# Patient Record
Sex: FEMALE | Race: BLACK OR AFRICAN AMERICAN | Employment: UNEMPLOYED | URBAN - METROPOLITAN AREA
[De-identification: names, ages, dates, MRNs, and addresses within clinical notes are randomized per-mention and may not be internally consistent; named-entity substitution may affect disease eponyms.]

---

## 2021-06-25 ENCOUNTER — APPOINTMENT (OUTPATIENT)
Dept: CT IMAGING | Age: 86
End: 2021-06-25
Attending: EMERGENCY MEDICINE
Payer: MEDICARE

## 2021-06-25 ENCOUNTER — APPOINTMENT (OUTPATIENT)
Dept: GENERAL RADIOLOGY | Age: 86
End: 2021-06-25
Attending: EMERGENCY MEDICINE
Payer: MEDICARE

## 2021-06-25 ENCOUNTER — HOSPITAL ENCOUNTER (OUTPATIENT)
Age: 86
Setting detail: OBSERVATION
Discharge: HOME | End: 2021-06-27
Attending: EMERGENCY MEDICINE | Admitting: INTERNAL MEDICINE
Payer: MEDICARE

## 2021-06-25 DIAGNOSIS — R00.1 SINUS BRADYCARDIA: ICD-10-CM

## 2021-06-25 DIAGNOSIS — R55 SYNCOPE AND COLLAPSE: Primary | ICD-10-CM

## 2021-06-25 LAB
ALBUMIN SERPL-MCNC: 3.1 G/DL (ref 3.5–5)
ALBUMIN/GLOB SERPL: 0.9 {RATIO} (ref 1.1–2.2)
ALP SERPL-CCNC: 60 U/L (ref 45–117)
ALT SERPL-CCNC: 14 U/L (ref 12–78)
ANION GAP SERPL CALC-SCNC: 3 MMOL/L (ref 5–15)
AST SERPL-CCNC: 11 U/L (ref 15–37)
ATRIAL RATE: 55 BPM
BASOPHILS # BLD: 0.1 K/UL (ref 0–0.1)
BASOPHILS NFR BLD: 1 % (ref 0–1)
BILIRUB SERPL-MCNC: 0.3 MG/DL (ref 0.2–1)
BNP SERPL-MCNC: 54 PG/ML
BUN SERPL-MCNC: 9 MG/DL (ref 6–20)
BUN/CREAT SERPL: 16 (ref 12–20)
CALCIUM SERPL-MCNC: 9.1 MG/DL (ref 8.5–10.1)
CALCULATED P AXIS, ECG09: 63 DEGREES
CALCULATED R AXIS, ECG10: 35 DEGREES
CALCULATED T AXIS, ECG11: 72 DEGREES
CHLORIDE SERPL-SCNC: 103 MMOL/L (ref 97–108)
CO2 SERPL-SCNC: 30 MMOL/L (ref 21–32)
CREAT SERPL-MCNC: 0.55 MG/DL (ref 0.55–1.02)
DIAGNOSIS, 93000: NORMAL
DIFFERENTIAL METHOD BLD: ABNORMAL
EOSINOPHIL # BLD: 0.3 K/UL (ref 0–0.4)
EOSINOPHIL NFR BLD: 3 % (ref 0–7)
ERYTHROCYTE [DISTWIDTH] IN BLOOD BY AUTOMATED COUNT: 13.5 % (ref 11.5–14.5)
GLOBULIN SER CALC-MCNC: 3.4 G/DL (ref 2–4)
GLUCOSE SERPL-MCNC: 112 MG/DL (ref 65–100)
HCT VFR BLD AUTO: 33.3 % (ref 35–47)
HGB BLD-MCNC: 11.2 G/DL (ref 11.5–16)
IMM GRANULOCYTES # BLD AUTO: 0 K/UL (ref 0–0.04)
IMM GRANULOCYTES NFR BLD AUTO: 1 % (ref 0–0.5)
LYMPHOCYTES # BLD: 3.4 K/UL (ref 0.8–3.5)
LYMPHOCYTES NFR BLD: 43 % (ref 12–49)
MAGNESIUM SERPL-MCNC: 2 MG/DL (ref 1.6–2.4)
MCH RBC QN AUTO: 31.5 PG (ref 26–34)
MCHC RBC AUTO-ENTMCNC: 33.6 G/DL (ref 30–36.5)
MCV RBC AUTO: 93.8 FL (ref 80–99)
MONOCYTES # BLD: 0.3 K/UL (ref 0–1)
MONOCYTES NFR BLD: 4 % (ref 5–13)
NEUTS SEG # BLD: 3.9 K/UL (ref 1.8–8)
NEUTS SEG NFR BLD: 48 % (ref 32–75)
NRBC # BLD: 0 K/UL (ref 0–0.01)
NRBC BLD-RTO: 0 PER 100 WBC
P-R INTERVAL, ECG05: 220 MS
PLATELET # BLD AUTO: 302 K/UL (ref 150–400)
PMV BLD AUTO: 8.8 FL (ref 8.9–12.9)
POTASSIUM SERPL-SCNC: 3.9 MMOL/L (ref 3.5–5.1)
PROT SERPL-MCNC: 6.5 G/DL (ref 6.4–8.2)
Q-T INTERVAL, ECG07: 414 MS
QRS DURATION, ECG06: 90 MS
QTC CALCULATION (BEZET), ECG08: 396 MS
RBC # BLD AUTO: 3.55 M/UL (ref 3.8–5.2)
SODIUM SERPL-SCNC: 136 MMOL/L (ref 136–145)
TROPONIN I SERPL-MCNC: <0.05 NG/ML
VENTRICULAR RATE, ECG03: 55 BPM
WBC # BLD AUTO: 7.9 K/UL (ref 3.6–11)

## 2021-06-25 PROCEDURE — 36415 COLL VENOUS BLD VENIPUNCTURE: CPT

## 2021-06-25 PROCEDURE — 71275 CT ANGIOGRAPHY CHEST: CPT

## 2021-06-25 PROCEDURE — 71045 X-RAY EXAM CHEST 1 VIEW: CPT

## 2021-06-25 PROCEDURE — 83735 ASSAY OF MAGNESIUM: CPT

## 2021-06-25 PROCEDURE — 85025 COMPLETE CBC W/AUTO DIFF WBC: CPT

## 2021-06-25 PROCEDURE — 99218 HC RM OBSERVATION: CPT

## 2021-06-25 PROCEDURE — 70450 CT HEAD/BRAIN W/O DYE: CPT

## 2021-06-25 PROCEDURE — 99285 EMERGENCY DEPT VISIT HI MDM: CPT

## 2021-06-25 PROCEDURE — 93005 ELECTROCARDIOGRAM TRACING: CPT

## 2021-06-25 PROCEDURE — 80053 COMPREHEN METABOLIC PANEL: CPT

## 2021-06-25 PROCEDURE — 74011250637 HC RX REV CODE- 250/637: Performed by: INTERNAL MEDICINE

## 2021-06-25 PROCEDURE — 84484 ASSAY OF TROPONIN QUANT: CPT

## 2021-06-25 PROCEDURE — 74011000636 HC RX REV CODE- 636: Performed by: EMERGENCY MEDICINE

## 2021-06-25 PROCEDURE — 83880 ASSAY OF NATRIURETIC PEPTIDE: CPT

## 2021-06-25 RX ORDER — ALBUTEROL SULFATE 0.83 MG/ML
2.5 SOLUTION RESPIRATORY (INHALATION)
COMMUNITY

## 2021-06-25 RX ORDER — ONDANSETRON 4 MG/1
4 TABLET, ORALLY DISINTEGRATING ORAL
Status: DISCONTINUED | OUTPATIENT
Start: 2021-06-25 | End: 2021-06-27 | Stop reason: HOSPADM

## 2021-06-25 RX ORDER — SODIUM CHLORIDE 0.9 % (FLUSH) 0.9 %
5-40 SYRINGE (ML) INJECTION AS NEEDED
Status: DISCONTINUED | OUTPATIENT
Start: 2021-06-25 | End: 2021-06-27 | Stop reason: HOSPADM

## 2021-06-25 RX ORDER — ENOXAPARIN SODIUM 100 MG/ML
40 INJECTION SUBCUTANEOUS DAILY
Status: DISCONTINUED | OUTPATIENT
Start: 2021-06-26 | End: 2021-06-25

## 2021-06-25 RX ORDER — SODIUM CHLORIDE 0.9 % (FLUSH) 0.9 %
5-40 SYRINGE (ML) INJECTION EVERY 8 HOURS
Status: DISCONTINUED | OUTPATIENT
Start: 2021-06-25 | End: 2021-06-27 | Stop reason: HOSPADM

## 2021-06-25 RX ORDER — ACETAMINOPHEN 650 MG/1
650 SUPPOSITORY RECTAL
Status: DISCONTINUED | OUTPATIENT
Start: 2021-06-25 | End: 2021-06-27 | Stop reason: HOSPADM

## 2021-06-25 RX ORDER — DOCUSATE SODIUM 100 MG/1
100 CAPSULE, LIQUID FILLED ORAL DAILY
COMMUNITY

## 2021-06-25 RX ORDER — POLYETHYLENE GLYCOL 3350 17 G/17G
17 POWDER, FOR SOLUTION ORAL DAILY PRN
Status: DISCONTINUED | OUTPATIENT
Start: 2021-06-25 | End: 2021-06-27 | Stop reason: HOSPADM

## 2021-06-25 RX ORDER — ACETAMINOPHEN 500 MG
500 TABLET ORAL
COMMUNITY

## 2021-06-25 RX ORDER — SODIUM CHLORIDE TAB 1 GM 1 G
1 TAB MISCELLANEOUS 2 TIMES DAILY
COMMUNITY

## 2021-06-25 RX ORDER — ACETAMINOPHEN 325 MG/1
650 TABLET ORAL
Status: DISCONTINUED | OUTPATIENT
Start: 2021-06-25 | End: 2021-06-27 | Stop reason: HOSPADM

## 2021-06-25 RX ORDER — FLUTICASONE PROPIONATE AND SALMETEROL 250; 50 UG/1; UG/1
1 POWDER RESPIRATORY (INHALATION) 2 TIMES DAILY
COMMUNITY

## 2021-06-25 RX ORDER — ONDANSETRON 2 MG/ML
4 INJECTION INTRAMUSCULAR; INTRAVENOUS
Status: DISCONTINUED | OUTPATIENT
Start: 2021-06-25 | End: 2021-06-27 | Stop reason: HOSPADM

## 2021-06-25 RX ORDER — AMOXICILLIN 250 MG
1 CAPSULE ORAL 2 TIMES DAILY
Status: DISCONTINUED | OUTPATIENT
Start: 2021-06-25 | End: 2021-06-27 | Stop reason: HOSPADM

## 2021-06-25 RX ORDER — CHOLECALCIFEROL TAB 125 MCG (5000 UNIT) 125 MCG
5000 TAB ORAL
COMMUNITY

## 2021-06-25 RX ORDER — ONDANSETRON 2 MG/ML
4 INJECTION INTRAMUSCULAR; INTRAVENOUS
Status: DISCONTINUED | OUTPATIENT
Start: 2021-06-25 | End: 2021-06-25 | Stop reason: SDUPTHER

## 2021-06-25 RX ORDER — ACETAMINOPHEN 325 MG/1
650 TABLET ORAL
Status: DISCONTINUED | OUTPATIENT
Start: 2021-06-25 | End: 2021-06-25 | Stop reason: SDUPTHER

## 2021-06-25 RX ORDER — ATORVASTATIN CALCIUM 10 MG/1
10 TABLET, FILM COATED ORAL
Status: DISCONTINUED | OUTPATIENT
Start: 2021-06-25 | End: 2021-06-27 | Stop reason: HOSPADM

## 2021-06-25 RX ADMIN — Medication 10 ML: at 21:53

## 2021-06-25 RX ADMIN — IOPAMIDOL 100 ML: 755 INJECTION, SOLUTION INTRAVENOUS at 17:02

## 2021-06-25 RX ADMIN — ATORVASTATIN CALCIUM 10 MG: 10 TABLET, FILM COATED ORAL at 21:53

## 2021-06-25 RX ADMIN — APIXABAN 5 MG: 5 TABLET, FILM COATED ORAL at 20:40

## 2021-06-25 NOTE — ED NOTES
1158: Assumed care of patient from EMS. Patient triaged. Placed on monitor x 3. SR x 2. Call bell in reach. 1645: Pt to CT at this time. 1735: Hospitalist at bedside. 1817: Patient is being transferred to [facility] Clinical OBS, Room # 75 [phone]. Report given to Raghu Medina on Kady Velasquez for routine progression of care. Report consisted of the following information SBAR, Kardex, ED Summary, Intake/Output, MAR and Recent Results. Patient transferred to receiving unit by: JOSELUIS Baird (RN or tech name). Outstanding consults needed: No     Next labs due: No     The following personal items will be sent with the patient during transfer to the floor:     All valuables:    Cardiac monitoring ordered: Yes    The following CURRENT information was reported to the receiving RN:    Code status: Full Code at time of transfer    Last set of vital signs:  Vital Signs  Level of Consciousness: Alert (0) (06/25/21 1645)  Temp: 98.4 °F (36.9 °C) (06/25/21 1645)  Temp Source: Oral (06/25/21 1645)  Pulse (Heart Rate): 68 (06/25/21 1645)  Cardiac Rhythm: Sinus Matthew (06/25/21 1351)  Resp Rate: 15 (06/25/21 1645)  BP: (!) 141/79 (06/25/21 1645)  MAP (Monitor): 93 (06/25/21 1645)  MAP (Calculated): 100 (06/25/21 1645)  BP 1 Location: Right arm (06/25/21 1538)  BP 1 Method: Automatic (06/25/21 1538)  BP Patient Position: Lying (06/25/21 1538)  MEWS Score: 1 (06/25/21 1645)    Additional Blood Pressure/Pulse Data  Pulse 2: 69 (06/25/21 1538)  BP 2: 144/74 (06/25/21 1538)  BP 2 Location: Right arm (06/25/21 1538)  BP Method 2: Automatic (06/25/21 1538)  Patient Position 2: Sitting (06/25/21 1538)    Oxygen Therapy  O2 Sat (%): 97 % (06/25/21 1645)  Pulse via Oximetry: 68 beats per minute (06/25/21 1645)  O2 Device: None (Room air) (06/25/21 1645)      Last pain assessment:  Pain 1  Pain Scale 1: Numeric (0 - 10)  Pain Intensity 1: 0  Patient Stated Pain Goal: 0      Wounds: No     LDAs:       Peripheral IV 06/25/21 Left Forearm (Active) Site Assessment Clean, dry, & intact 06/25/21 1158   Phlebitis Assessment 0 06/25/21 1158   Infiltration Assessment 0 06/25/21 1158   Dressing Status Clean, dry, & intact 06/25/21 1158   Dressing Type Transparent 06/25/21 1158   Hub Color/Line Status Flushed 06/25/21 1158       Peripheral IV 06/25/21 Left Antecubital (Active)   Site Assessment Clean, dry, & intact 06/25/21 1535   Phlebitis Assessment 0 06/25/21 1535   Infiltration Assessment 0 06/25/21 1535   Dressing Status Clean, dry, & intact 06/25/21 1535   Dressing Type 4 X 4 06/25/21 1535         Opportunity for questions and clarification was provided.     Dayna Hand

## 2021-06-25 NOTE — H&P
Hospitalist Admission Note    NAME: Norman Narvaez   :  1934   MRN:  [id number]     Date/Time:  2021 3:59 PM    Patient PCP: Unknown, Provider, MD  _____________________________________________________________________  Given the patient's current clinical presentation, I have a high level of concern for decompensation if discharged from the emergency department. Complex decision making was performed, which includes reviewing the patient's available past medical records, laboratory results, and x-ray films. My assessment of this patient's clinical condition and my plan of care is as follows. Assessment / Plan:    Syncope  History of atrial fibrillation? Sinus bradycardia (55)  -CTA of chest no PE.    -Echocardiogram  -Cardiac monitoring to screen for sick sinus syndrome  -She is not prerenal by labs. -She is not ambulatory. Follow supine and sitting blood pressure  -continue eliquis  -observation admit. Likely home over the weekend if telemetry, orthostatic BP and Echo are okay    Hyperlipidemia  -Continue statin    Glaucoma  -She is now blind    S/P ORIF for left wrist and left distal femur fracture     Code Status: Full  Surrogate Decision Maker: daughter    DVT Prophylaxis: eliquis  GI Prophylaxis: not indicated    Baseline: . Lives with daughter. Bedbound. Blind        Subjective:   CHIEF COMPLAINT:  Passed out    HISTORY OF PRESENT ILLNESS:     Henry Portillo is a 80 y.o. female with a history of atrial fibrillation hypertension hyperlipidemia and blindness who presents to the ER via EMS after a syncopal episode. Patient reportedly passed out once back in 2017. The episode today occurred in her bedroom. Patient is blind and hard of hearing. Family states that the patient was eating and sitting at the edge of her bed. Family then heard a noise from the room and found the patient passed out laying back on her bed.  They do not know if there was loss of pulses but family did start some chest compressions and the patient quickly came to. Family states they took her blood pressure when she was gasping for breath and feeling bad and they noted it was low at around 359 systolic which is not normal for her. EMS did note that her heart rate would drop sporadically. Family does endorse a history of having a low heart rate and states that she is taking Eliquis because of it. In the ED, patient currently appears to be back at her baseline. Reportedly she has been eating and drinking well. CTA chest no PE. We were asked to admit for work up and evaluation of the above problems. Past Medical History:   Diagnosis Date    Cataract     Glaucoma     Hypercholesteremia     Hypertension         History reviewed. No pertinent surgical history. Social History     Tobacco Use    Smoking status: Never Smoker    Smokeless tobacco: Never Used   Substance Use Topics    Alcohol use: Yes        Family History   Problem Relation Age of Onset    Hypertension Mother     Hypertension Father      No Known Allergies     Prior to Admission medications    Medication Sig Start Date End Date Taking? Authorizing Provider   bisacodyl (DULCOLAX) 10 mg suppository Insert 10 mg into rectum daily. 9/11/12   Bernard Shelton MD   ascorbic acid (VITAMIN C) 500 mg tablet Take 1 Tab by mouth two (2) times daily (with meals). 9/11/12   Bernard Shelton MD   brimonidine (ALPHAGAN) 0.2 % ophthalmic solution Administer 1 Drop to both eyes three (3) times daily. 9/11/12   Bernard Shelton MD   calcium-vitamin D (OYSTER SHELL) 500 mg(1,250mg) -200 unit per tablet Take 1 Tab by mouth three (3) times daily (with meals). 9/11/12   Bernard Shelton MD   cholecalciferol (VITAMIN D3) 1,000 unit tablet Take 2 Tabs by mouth daily. 9/11/12   Bernard Shelton MD   dorzolamide-timolol (COSOPT) 2-0.5 % ophthalmic solution Administer 1 Drop to both eyes two (2) times a day.  9/11/12   Bernard Shelton MD   ferrous sulfate 325 mg (65 mg iron) tablet Take 1 Tab by mouth three (3) times daily (with meals). 9/11/12   Daniel Eldridge MD   HYDROcodone-acetaminophen [facility]  mg tablet Take 1 Tab by mouth every four (4) hours as needed. 9/11/12   Daniel Eldridge MD   latanoprost (XALATAN) 0.005 % ophthalmic solution Administer 1 Drop to both eyes every evening. 9/11/12   Daniel Eldridge MD   senna-docusate (PERICOLACE) 8.6-50 mg per tablet Take 1 Tab by mouth two (2) times a day. 9/11/12   Daniel Eldridge MD   enoxaparin (LOVENOX) 40 mg/0.4 mL 40 mg by SubCUTAneous route daily. 9/11/12   Daniel Eldridge MD   hydrochlorothiazide (HYDRODIURIL) 25 mg tablet Take 25 mg by mouth daily. Fany Yusuf MD   simvastatin (ZOCOR) 20 mg tablet Take 20 mg by mouth nightly.       Fany Yusuf MD       REVIEW OF SYSTEMS:     Total of 12 systems reviewed as follows:       POSITIVE= underlined text  Negative = text not underlined  General:  fever, chills, sweats, generalized weakness, weight loss/gain,      loss of appetite   Eyes:    blurred vision, eye pain, loss of vision, double vision  ENT:    rhinorrhea, pharyngitis   Respiratory:   cough, sputum production, SOB, [place], wheezing, pleuritic pain   Cardiology:   chest pain, palpitations, orthopnea, PND, edema, syncope   Gastrointestinal:  abdominal pain , N/V, diarrhea, dysphagia, constipation, bleeding   Genitourinary:  frequency, urgency, dysuria, hematuria, incontinence   Muskuloskeletal :  arthralgia, myalgia, back pain  Hematology:  easy bruising, nose or gum bleeding, lymphadenopathy   Dermatological: rash, ulceration, pruritis, color change / jaundice  Endocrine:   hot flashes or polydipsia   Neurological:  headache, dizziness, confusion, focal weakness, paresthesia,     Speech difficulties, memory loss, non ambulatory, syncope  Psychological: Feelings of anxiety, depression, agitation    Objective:   VITALS:    Visit Vitals  /72 (BP 1 Location: Right arm, BP Patient Position: Lying)   Pulse 66   Temp 97.7 °F (36.5 °C)   Resp 18   Ht 5' 5\" (1.651 m)   Wt 93.9 kg (207 lb)   SpO2 97%   BMI 34.45 kg/m²       PHYSICAL EXAM:    General:    Alert, cooperative, no distress, appears stated age. HEENT: Atraumatic, anicteric sclerae, pink conjunctivae,  BLIND  Neck:  Supple, symmetrical,  thyroid: non tender  Lungs:   Clear to auscultation bilaterally. No Wheezing or Rhonchi. No rales. Chest wall:  No tenderness  No Accessory muscle use. Heart:   Regular  rhythm,  No  murmur   No edema  Abdomen:   Soft, non-tender. Not distended. Bowel sounds normal  Extremities: No cyanosis. No clubbing,      Contracted LE  Skin:     Not pale. Not Jaundiced  No rashes   Psych:  Fair insight. Not depressed. Not anxious or agitated. Neurologic: EOMs intact. No facial asymmetry. No aphasia or slurred speech. Symmetrical strength, Sensation grossly intact. Alert and follows commands    _______________________________________________________________________  Care Plan discussed with:    Comments   Patient y    Family  y    RN     Care Manager                    Consultant:      _______________________________________________________________________  Expected  Disposition:   Home with Family    HH/PT/OT/RN    SNF/LTC    LAUREN    ________________________________________________________________________  TOTAL TIME:  39  Minutes    Critical Care Provided     Minutes non procedure based      Comments     Reviewed previous records   >50% of visit spent in counseling and coordination of care  Discussion with patient and/or family and questions answered       Given the patient's current clinical presentation, I have a high level of concern for decompensation if discharged from the ED. Complex decision making was performed which includes reviewing the patient's available past medical records, laboratory results, and Xray films.  I have also directly communicated my plan and discussed this case with the involved ED physician.     ____________________________________________________________________  Reji Lombardi MD    Procedures: see electronic medical records for all procedures/Xrays and details which were not copied into this note but were reviewed prior to creation of Plan. LAB DATA REVIEWED:    Recent Results (from the past 24 hour(s))   EKG, 12 LEAD, INITIAL    Collection Time: 06/25/21 11:59 AM   Result Value Ref Range    Ventricular Rate 55 BPM    Atrial Rate 55 BPM    P-R Interval 220 ms    QRS Duration 90 ms    Q-T Interval 414 ms    QTC Calculation (Bezet) 396 ms    Calculated P Axis 63 degrees    Calculated R Axis 35 degrees    Calculated T Axis 72 degrees    Diagnosis       Sinus bradycardia with 1st degree AV block  Nonspecific T wave abnormality  When compared with ECG of 08-SEP-2012 02:46,  [place] interval has increased  Vent. rate has decreased BY  29 BPM     CBC WITH AUTOMATED DIFF    Collection Time: 06/25/21 12:18 PM   Result Value Ref Range    WBC 7.9 3.6 - 11.0 K/uL    RBC 3.55 (L) 3.80 - 5.20 M/uL    HGB 11.2 (L) 11.5 - 16.0 g/dL    HCT 33.3 (L) 35.0 - 47.0 %    MCV 93.8 80.0 - 99.0 FL    MCH 31.5 26.0 - 34.0 PG    MCHC 33.6 30.0 - 36.5 g/dL    RDW 13.5 11.5 - 14.5 %    PLATELET 687 373 - 371 K/uL    MPV 8.8 (L) 8.9 - 12.9 FL    NRBC 0.0 0  WBC    ABSOLUTE NRBC 0.00 0.00 - 0.01 K/uL    NEUTROPHILS 48 32 - 75 %    LYMPHOCYTES 43 12 - 49 %    MONOCYTES 4 (L) 5 - 13 %    EOSINOPHILS 3 0 - 7 %    BASOPHILS 1 0 - 1 %    IMMATURE GRANULOCYTES 1 (H) 0.0 - 0.5 %    ABS. NEUTROPHILS 3.9 1.8 - 8.0 K/UL    ABS. LYMPHOCYTES 3.4 0.8 - 3.5 K/UL    ABS. MONOCYTES 0.3 0.0 - 1.0 K/UL    ABS. EOSINOPHILS 0.3 0.0 - 0.4 K/UL    ABS. BASOPHILS 0.1 0.0 - 0.1 K/UL    ABS. IMM.  GRANS. 0.0 0.00 - 0.04 K/UL    DF AUTOMATED     METABOLIC PANEL, COMPREHENSIVE    Collection Time: 06/25/21 12:18 PM   Result Value Ref Range    Sodium 136 136 - 145 mmol/L    Potassium 3.9 3.5 - 5.1 mmol/L    Chloride 103 97 - 108 mmol/L    CO2 30 21 - 32 mmol/L    Anion gap 3 (L) 5 - 15 mmol/L    Glucose 112 (H) 65 - 100 mg/dL    BUN 9 6 - 20 MG/DL    Creatinine 0.55 0.55 - 1.02 MG/DL    BUN/Creatinine ratio 16 12 - 20      GFR est AA >60 >60 ml/min/1.73m2    GFR est non-AA >60 >60 ml/min/1.73m2    Calcium 9.1 8.5 - 10.1 MG/DL    Bilirubin, total 0.3 0.2 - 1.0 MG/DL    ALT (SGPT) 14 12 - 78 U/L    AST (SGOT) 11 (L) 15 - 37 U/L    Alk.  phosphatase 60 45 - 117 U/L    Protein, total 6.5 6.4 - 8.2 g/dL    Albumin 3.1 (L) 3.5 - 5.0 g/dL    Globulin 3.4 2.0 - 4.0 g/dL    A-G Ratio 0.9 (L) 1.1 - 2.2     NT-PRO BNP    Collection Time: 06/25/21 12:18 PM   Result Value Ref Range    NT pro-BNP 54 <450 PG/ML   TROPONIN I    Collection Time: 06/25/21 12:18 PM   Result Value Ref Range    Troponin-I, Qt. <0.05 <0.05 ng/mL   MAGNESIUM    Collection Time: 06/25/21 12:18 PM   Result Value Ref Range    Magnesium 2.0 1.6 - 2.4 mg/dL

## 2021-06-25 NOTE — ED PROVIDER NOTES
EMERGENCY DEPARTMENT HISTORY AND PHYSICAL EXAM      Date: 6/25/2021  Patient Name: Marta Arce    History of Presenting Illness     Chief Complaint   Patient presents with    Syncope     arrived via EMS for state family reports pt keeps passing out, pt HR drops and pt fell asleep for EMS. easily arousable. History Provided By: Patient's Daughter    HPI: Marta Arce, 80 y.o. female  presents to the ED with cc of syncope. Patient is blind and hard of hearing. Family states that she had just eaten and went back to her room. While in her room she seemed to gasp for breath and had a syncopal episode. Family called 911. They do not know if there was loss of pulses but family did start some chest compressions and the patient quickly came to. She was oriented at her baseline after coming to. Family states they took her blood pressure when she was gasping for breath and feeling bad and they noted it was low at around 917 systolic which is not normal for her. EMS did note that her heart rate would drop sporadically. Family does endorse a history of having a low heart rate and states that she is taking Eliquis because of it but denies any history of A. fib. No history of stroke or thromboembolism. Patient currently appears to be back at her baseline. Reportedly she has been eating and drinking well. Past History     Past Medical History:  Past Medical History:   Diagnosis Date    Cataract     Glaucoma     Hypercholesteremia     Hypertension        Past Surgical History:  History reviewed. No pertinent surgical history. Medications:  No current facility-administered medications on file prior to encounter. Current Outpatient Medications on File Prior to Encounter   Medication Sig Dispense Refill    bisacodyl (DULCOLAX) 10 mg suppository Insert 10 mg into rectum daily. 30 Suppository 0    ascorbic acid (VITAMIN C) 500 mg tablet Take 1 Tab by mouth two (2) times daily (with meals).  60 Tab 0    brimonidine (ALPHAGAN) 0.2 % ophthalmic solution Administer 1 Drop to both eyes three (3) times daily. 5 mL 0    calcium-vitamin D (OYSTER SHELL) 500 mg(1,250mg) -200 unit per tablet Take 1 Tab by mouth three (3) times daily (with meals). 90 Tab 0    cholecalciferol (VITAMIN D3) 1,000 unit tablet Take 2 Tabs by mouth daily. 60 Tab 0    dorzolamide-timolol (COSOPT) 2-0.5 % ophthalmic solution Administer 1 Drop to both eyes two (2) times a day. 5 mL 0    ferrous sulfate 325 mg (65 mg iron) tablet Take 1 Tab by mouth three (3) times daily (with meals). 90 Tab 0    HYDROcodone-acetaminophen (NORCO)  mg tablet Take 1 Tab by mouth every four (4) hours as needed. 20 Tab 0    latanoprost (XALATAN) 0.005 % ophthalmic solution Administer 1 Drop to both eyes every evening. 5 mL 0    senna-docusate (PERICOLACE) 8.6-50 mg per tablet Take 1 Tab by mouth two (2) times a day. 60 Tab 0    enoxaparin (LOVENOX) 40 mg/0.4 mL 40 mg by SubCUTAneous route daily. 30 Syringe 0    hydrochlorothiazide (HYDRODIURIL) 25 mg tablet Take 25 mg by mouth daily.  simvastatin (ZOCOR) 20 mg tablet Take 20 mg by mouth nightly. Family History:  Family History   Problem Relation Age of Onset    Hypertension Mother     Hypertension Father        Social History:  Social History     Tobacco Use    Smoking status: Never Smoker    Smokeless tobacco: Never Used   Substance Use Topics    Alcohol use: Yes    Drug use: Not on file       Allergies:  No Known Allergies    All the above components of the past  history are auto-populated from the electronic record. They have been reviewed and the patient has been interviewed for any pertinent past history that pertains to the patient's chief complaint and reason for visit. Not all pre-populated components may be accurate at the time this note was generated. Review of Systems   Review of Systems   Constitutional: Negative for chills and fever.    HENT: Negative for congestion, ear pain, rhinorrhea, sore throat and trouble swallowing. Eyes: Negative for visual disturbance. Respiratory: Positive for shortness of breath. Negative for cough and chest tightness. Cardiovascular: Negative for chest pain and palpitations. Gastrointestinal: Negative for abdominal pain, blood in stool, constipation, diarrhea, nausea and vomiting. Genitourinary: Negative for decreased urine volume, difficulty urinating, dysuria and frequency. Musculoskeletal: Negative for back pain and neck pain. Skin: Negative for color change and rash. Neurological: Positive for syncope. Negative for dizziness, weakness, light-headedness and headaches. Physical Exam   Physical Exam  Vitals and nursing note reviewed. Constitutional:       General: She is not in acute distress. Appearance: She is well-developed. She is not ill-appearing. HENT:      Head: Normocephalic. Eyes:      Conjunctiva/sclera: Conjunctivae normal.   Cardiovascular:      Rate and Rhythm: Regular rhythm. Bradycardia present. Pulmonary:      Effort: Pulmonary effort is normal. No accessory muscle usage or respiratory distress. Abdominal:      General: There is no distension. Musculoskeletal:      Cervical back: Normal range of motion. Skin:     General: Skin is warm and dry. Neurological:      Mental Status: She is alert, oriented to person, place, and time and easily aroused. Due to the COVID-19 pandemic, in order to reduce the spread and transmission of the virus, some basic elements of the physical exam have been deferred to reduce direct or close contact with the patient unless they are deemed to be absolutely necessary, regardless of whether the virus is highly suspected or not.       Diagnostic Study Results     Labs -     Recent Results (from the past 24 hour(s))   EKG, 12 LEAD, INITIAL    Collection Time: 06/25/21 11:59 AM   Result Value Ref Range    Ventricular Rate 55 BPM    Atrial Rate 55 BPM    P-R Interval 220 ms    QRS Duration 90 ms    Q-T Interval 414 ms    QTC Calculation (Bezet) 396 ms    Calculated P Axis 63 degrees    Calculated R Axis 35 degrees    Calculated T Axis 72 degrees    Diagnosis       Sinus bradycardia with 1st degree AV block  Nonspecific T wave abnormality  When compared with ECG of 08-SEP-2012 02:46,  FL interval has increased  Vent. rate has decreased BY  29 BPM     CBC WITH AUTOMATED DIFF    Collection Time: 06/25/21 12:18 PM   Result Value Ref Range    WBC 7.9 3.6 - 11.0 K/uL    RBC 3.55 (L) 3.80 - 5.20 M/uL    HGB 11.2 (L) 11.5 - 16.0 g/dL    HCT 33.3 (L) 35.0 - 47.0 %    MCV 93.8 80.0 - 99.0 FL    MCH 31.5 26.0 - 34.0 PG    MCHC 33.6 30.0 - 36.5 g/dL    RDW 13.5 11.5 - 14.5 %    PLATELET 498 909 - 385 K/uL    MPV 8.8 (L) 8.9 - 12.9 FL    NRBC 0.0 0  WBC    ABSOLUTE NRBC 0.00 0.00 - 0.01 K/uL    NEUTROPHILS 48 32 - 75 %    LYMPHOCYTES 43 12 - 49 %    MONOCYTES 4 (L) 5 - 13 %    EOSINOPHILS 3 0 - 7 %    BASOPHILS 1 0 - 1 %    IMMATURE GRANULOCYTES 1 (H) 0.0 - 0.5 %    ABS. NEUTROPHILS 3.9 1.8 - 8.0 K/UL    ABS. LYMPHOCYTES 3.4 0.8 - 3.5 K/UL    ABS. MONOCYTES 0.3 0.0 - 1.0 K/UL    ABS. EOSINOPHILS 0.3 0.0 - 0.4 K/UL    ABS. BASOPHILS 0.1 0.0 - 0.1 K/UL    ABS. IMM. GRANS. 0.0 0.00 - 0.04 K/UL    DF AUTOMATED     METABOLIC PANEL, COMPREHENSIVE    Collection Time: 06/25/21 12:18 PM   Result Value Ref Range    Sodium 136 136 - 145 mmol/L    Potassium 3.9 3.5 - 5.1 mmol/L    Chloride 103 97 - 108 mmol/L    CO2 30 21 - 32 mmol/L    Anion gap 3 (L) 5 - 15 mmol/L    Glucose 112 (H) 65 - 100 mg/dL    BUN 9 6 - 20 MG/DL    Creatinine 0.55 0.55 - 1.02 MG/DL    BUN/Creatinine ratio 16 12 - 20      GFR est AA >60 >60 ml/min/1.73m2    GFR est non-AA >60 >60 ml/min/1.73m2    Calcium 9.1 8.5 - 10.1 MG/DL    Bilirubin, total 0.3 0.2 - 1.0 MG/DL    ALT (SGPT) 14 12 - 78 U/L    AST (SGOT) 11 (L) 15 - 37 U/L    Alk.  phosphatase 60 45 - 117 U/L    Protein, total 6.5 6.4 - 8.2 g/dL Albumin 3.1 (L) 3.5 - 5.0 g/dL    Globulin 3.4 2.0 - 4.0 g/dL    A-G Ratio 0.9 (L) 1.1 - 2.2     NT-PRO BNP    Collection Time: 06/25/21 12:18 PM   Result Value Ref Range    NT pro-BNP 54 <450 PG/ML   TROPONIN I    Collection Time: 06/25/21 12:18 PM   Result Value Ref Range    Troponin-I, Qt. <0.05 <0.05 ng/mL   MAGNESIUM    Collection Time: 06/25/21 12:18 PM   Result Value Ref Range    Magnesium 2.0 1.6 - 2.4 mg/dL       Radiologic Studies -   XR CHEST PORT   Final Result   No acute findings. CT HEAD WO CONT   Final Result      1. Ventriculomegaly has increased in the interval. Diffuse thinning of the   corpus callosum and mild prominence of the cortical sulci which suggest this   finding is on the basis of central atrophy. Mild periventricular chronic small   vessel ischemic disease the white matter is shown in the interval.   2. Interim opacification of mastoid air cells bilaterally, for which clinical   correlation is advised. CTA CHEST W OR W WO CONT    (Results Pending)     CT Results  (Last 48 hours)               06/25/21 1341  CT HEAD WO CONT Final result    Impression:      1. Ventriculomegaly has increased in the interval. Diffuse thinning of the   corpus callosum and mild prominence of the cortical sulci which suggest this   finding is on the basis of central atrophy. Mild periventricular chronic small   vessel ischemic disease the white matter is shown in the interval.   2. Interim opacification of mastoid air cells bilaterally, for which clinical   correlation is advised. Narrative:  EXAM: CT HEAD WO CONT       INDICATION: syncope       COMPARISON: 9/8/2012 CT. CONTRAST: None. TECHNIQUE: Unenhanced CT of the head was performed using 5 mm images. Brain and   bone windows were generated. Coronal and sagittal reformats. CT dose reduction   was achieved through use of a standardized protocol tailored for this   examination and automatic exposure control for dose modulation. FINDINGS:   Moderate ventriculomegaly is again shown which has increased in the interval.   Diffuse thinning of the corpus callosum is shown. There is mild cortical sulcal   prominence again noted. Mild bilateral periventricular white matter diminished   attenuation in the cerebrum is noted in the interval. There is no intracranial   hemorrhage, extra-axial collection, or mass effect. The basilar cisterns are   open. No CT evidence of acute infarct. The bone windows demonstrate no abnormalities. The visualized paranasal sinuses   are normal. There is interim opacification within the mastoid air cells   bilaterally, greater on the right with relative preservation within the petrous   apices. .               CXR Results  (Last 48 hours)               06/25/21 1423  XR CHEST PORT Final result    Impression:  No acute findings. Narrative:  EXAM: XR CHEST PORT       INDICATION: syncope       COMPARISON: 9/8/2012       FINDINGS: A portable AP radiograph of the chest was obtained at 1402 hours. The   patient is rotated substantially toward the right. The lungs and pleural margins   are clear. Cardiac and mediastinal contours cannot be well assessed due to the   obliquity of the image though overall, the appearance is similar to that shown   previously. The bones are severely osteopenic. Severe left glenohumeral   degenerative changes are shown. Medical Decision Making     I reviewed the vital signs, available nursing notes, past medical history, past surgical history, family history and social history. Vital Signs-I have reviewed the vital signs that have been made available during the patient's emergency department visit. The vital signs auto-populated below are obtained mostly by electronic means through monitoring devices that have been downloaded into the patient's chart by the nursing staff.   Some vital signs are not downloaded into the chart until after the patient has been discharged and this note has been completed, therefore some vital signs may not be available to the physician for review prior to patient's discharge or admission. The physician has reviewed the patient's triage vital signs, monitored the electronic monitoring devices remotely for any significant vital sign abnormalities, and have reviewed vital signs prior to discharge. Some vital signs reviewed at bedside or remotely utilizing electronic monitoring devices may be different than the vital signs downloaded into the electronic medical record. Some vital signs may be erroneous and inaccurate since they are obtained by electronic monitoring devices, and not all vital signs are verified for accuracy by nursing staff prior to downloading into the patient's chart. Patient Vitals for the past 24 hrs:   Temp Pulse Resp BP SpO2   06/25/21 1351     96 %   06/25/21 1153 97.7 °F (36.5 °C) (!) 56 14 120/86 99 %         Records Reviewed: Nursing notes for today's visit have been reviewed. I have also reviewed most recent medical records pertinent to today's complaints, if available in our medical record system. I have also reviewed all labs and imaging results from previous results in comparison to results obtained today. If an EKG was obtained today, it has been compared to previous EKGs, if available. If arriving via EMS, the EMS report has been reviewed if made available to us within the patient's time in the emergency department. Provider Notes (Medical Decision Making):   Patient presents to the emergency department after an episode of syncope at home. Here in the emergency department she had a sinus bradycardia but it was in the high 50s and I do not believe is significant at this time but EMS was reporting lower heart rates. She has been eating and drinking well. She does not look dehydrated. There is no MAYANK or electrolyte abnormalities on her labs. No signs of infection. Normal white blood cell count. No fever. Chest x-ray does not show any pneumonia. CT of the head does not show any evidence of hemorrhage or stroke. Patient reportedly did gasp for air before she passed out but currently is not tachypneic or in respiratory distress. She is not tachycardic. She is not hypoxic. She is currently on Eliquis according to the family for what reason I do not know. I would have low suspicion for a PE. I would recommend admission and at the time of speaking with the hospitalist that they would recommend a CTA of the chest to rule out pulmonary embolism just because she was gasping for air. I think it is unlikely but it will complete the work-up and help rule out this etiology. We do not have any other reason for her syncope at this time unless she did get extremely bradycardic. ED Course:   Initial assessment performed. The patients presenting problems have been discussed, and they are in agreement with the care plan formulated and outlined with them. I have encouraged them to ask questions as they arise throughout their visit.          Orders Placed This Encounter    XR CHEST PORT    CT HEAD WO CONT    CTA CHEST W OR W WO CONT    CBC WITH AUTOMATED DIFF    METABOLIC PANEL, COMPREHENSIVE    Hold Sample    PRO-BNP    TROPONIN I    MAGNESIUM    ADULT DIET Regular    EKG NOTEWRITER(ASAP ONLY)    B/P LYING/SIT/STANDING    NOTIFY PROVIDER: SPECIFY Notify provider on pt's arrival to floor ONE TIME STAT    OUT OF BED WITH ASSISTANCE    VITAL SIGNS PER UNIT ROUTINE    FULL CODE    EKG, 12 LEAD, INITIAL    INSERT PERIPHERAL IV ONE TIME STAT    acetaminophen (TYLENOL) tablet 650 mg    ondansetron (ZOFRAN) injection 4 mg    IP CONSULT TO HOSPITALIST       EKG    Date/Time: 6/25/2021 11:59 AM  Performed by: Yunier Carroll MD  Authorized by: Yunier Carroll MD     ECG reviewed by ED Physician in the absence of a cardiologist: yes    Rate:     ECG rate:  55    ECG rate assessment: bradycardic Rhythm:     Rhythm: sinus bradycardia    Ectopy:     Ectopy: none    QRS:     QRS axis:  Normal  Conduction:     Conduction: abnormal      Abnormal conduction: 1st degree    ST segments:     ST segments:  Normal  T waves:     T waves: non-specific            Critical Care Time:   0    Disposition:  Admit        Diagnosis     Clinical Impression:   1. Syncope and collapse    2.  Sinus bradycardia

## 2021-06-25 NOTE — PROGRESS NOTES
Pharmacy Medication Reconciliation     The patient was interviewed regarding current PTA medication list, use and drug allergies;  patient present in room and obtained permission from patient to discuss drug regimen with visitor(s) present. The patient was questioned regarding use of any other inhalers, topical products, over the counter medications, herbal medications, vitamin products or ophthalmic/nasal/otic medication use. Allergy Update: Patient has no known allergies. Recommendations/Findings: The following amendments were made to the patient's active medication list on file at [facility]:   1) Additions:   +acetaminophen  +sodium chloride  +docusate  +albuterol neb  +eiliquis  +xena    2) Deletions:   -bisacodyl  -brimonidine  -cosopt  -enoxaparin  -hydrochlorothiazide  -norco  -xalatan  -senna-docusate    3) Changes:   (Old regimen: vitamin D 2000 units daily /New regimen: vitamin D 5,000 units monthly)  Pertinent Findings: none    Clarified PTA med list with patient interview, Rx query, phone conversation with [facility]. PTA medication list was corrected to the following:     Prior to Admission Medications   Prescriptions Last Dose Informant Taking?   acetaminophen (Tylenol Extra Strength) 500 mg tablet  Self Yes   Sig: Take 500 mg by mouth every six (6) hours as needed for Pain. albuterol (PROVENTIL VENTOLIN) 2.5 mg /3 mL (0.083 %) nebu  Self Yes   Si.5 mg by Nebulization route every six (6) hours as needed for Wheezing or Shortness of Breath. apixaban (ELIQUIS) 5 mg tablet 2021 at 0900 Self Yes   Sig: Take 5 mg by mouth two (2) times a day. ascorbic acid (VITAMIN C) 500 mg tablet 2021 at 0900 Self Yes   Sig: Take 1 Tab by mouth two (2) times daily (with meals). calcium-vitamin D (OYSTER SHELL) 500 mg(1,250mg) -200 unit per tablet 2021 at 0900 Self Yes   Sig: Take 1 Tab by mouth three (3) times daily (with meals).    cholecalciferol (VITAMIN D3) (5000 Units/125 mcg) tab tablet 6/1/2021 Self Yes   Sig: Take 5,000 Units by mouth every thirty (30) days. docusate sodium (COLACE) 100 mg capsule 6/25/2021 at 0900 Self Yes   Sig: Take 100 mg by mouth daily. ferrous sulfate 325 mg (65 mg iron) tablet 6/25/2021 at 0900 Self Yes   Sig: Take 1 Tab by mouth three (3) times daily (with meals). fluticasone propion-salmeteroL (Advair Diskus) 250-50 mcg/dose diskus inhaler 6/25/2021 at 0900 Self Yes   Sig: Take 1 Puff by inhalation two (2) times a day. simvastatin (ZOCOR) 20 mg tablet 6/24/2021 at 2100 Self Yes   Sig: Take 20 mg by mouth nightly.     sodium chloride 1 gram tablet 6/25/2021 at 0900 Self Yes   Sig: Take 1 g by mouth two (2) times a day.       Facility-Administered Medications: None        Thank you,  SILVIO Meyer

## 2021-06-26 ENCOUNTER — APPOINTMENT (OUTPATIENT)
Dept: NON INVASIVE DIAGNOSTICS | Age: 86
End: 2021-06-26
Attending: INTERNAL MEDICINE
Payer: MEDICARE

## 2021-06-26 LAB
ECHO AV MEAN GRADIENT: 2.98 MMHG
ECHO AV PEAK GRADIENT: 4.6 MMHG
ECHO AV PEAK VELOCITY: 107.21 CM/S
ECHO AV VTI: 17.56 CM
ECHO LV E' LATERAL VELOCITY: 8.84 CM/S
ECHO LV E' SEPTAL VELOCITY: 5.41 CM/S
ECHO LVOT PEAK GRADIENT: 2.09 MMHG
ECHO LVOT PEAK VELOCITY: 72.26 CM/S
ECHO LVOT VTI: 13.51 CM
ECHO MV A VELOCITY: 63 CM/S
ECHO MV AREA PHT: 3.87 CM2
ECHO MV E DECELERATION TIME (DT): 270.32 MS
ECHO MV E VELOCITY: 44.21 CM/S
ECHO MV E/A RATIO: 0.7
ECHO MV E/E' LATERAL: 5
ECHO MV E/E' RATIO (AVERAGED): 6.59
ECHO MV E/E' SEPTAL: 8.17
ECHO MV MAX VELOCITY: 63.75 CM/S
ECHO MV MEAN GRADIENT: 0.73 MMHG
ECHO MV PEAK GRADIENT: 1.63 MMHG
ECHO MV PRESSURE HALF TIME (PHT): 56.77 MS
ECHO MV VTI: 17.03 CM
LVOT MG: 1.55 MMHG

## 2021-06-26 PROCEDURE — 97161 PT EVAL LOW COMPLEX 20 MIN: CPT | Performed by: PHYSICAL THERAPIST

## 2021-06-26 PROCEDURE — 2709999900 HC NON-CHARGEABLE SUPPLY

## 2021-06-26 PROCEDURE — 74011250637 HC RX REV CODE- 250/637: Performed by: INTERNAL MEDICINE

## 2021-06-26 PROCEDURE — 97165 OT EVAL LOW COMPLEX 30 MIN: CPT | Performed by: OCCUPATIONAL THERAPIST

## 2021-06-26 PROCEDURE — C8929 TTE W OR WO FOL WCON,DOPPLER: HCPCS

## 2021-06-26 PROCEDURE — 99218 HC RM OBSERVATION: CPT

## 2021-06-26 PROCEDURE — 97530 THERAPEUTIC ACTIVITIES: CPT | Performed by: PHYSICAL THERAPIST

## 2021-06-26 PROCEDURE — 97530 THERAPEUTIC ACTIVITIES: CPT | Performed by: OCCUPATIONAL THERAPIST

## 2021-06-26 PROCEDURE — 74011250636 HC RX REV CODE- 250/636: Performed by: INTERNAL MEDICINE

## 2021-06-26 PROCEDURE — 93306 TTE W/DOPPLER COMPLETE: CPT | Performed by: INTERNAL MEDICINE

## 2021-06-26 RX ADMIN — ATORVASTATIN CALCIUM 10 MG: 10 TABLET, FILM COATED ORAL at 22:05

## 2021-06-26 RX ADMIN — DOCUSATE SODIUM 50MG AND SENNOSIDES 8.6MG 1 TABLET: 8.6; 5 TABLET, FILM COATED ORAL at 22:05

## 2021-06-26 RX ADMIN — APIXABAN 5 MG: 5 TABLET, FILM COATED ORAL at 18:38

## 2021-06-26 RX ADMIN — Medication 10 ML: at 14:00

## 2021-06-26 RX ADMIN — PERFLUTREN 2 ML: 6.52 INJECTION, SUSPENSION INTRAVENOUS at 10:51

## 2021-06-26 RX ADMIN — Medication 5 ML: at 05:16

## 2021-06-26 RX ADMIN — Medication 10 ML: at 22:07

## 2021-06-26 RX ADMIN — APIXABAN 5 MG: 5 TABLET, FILM COATED ORAL at 09:00

## 2021-06-26 NOTE — PROGRESS NOTES
OCCUPATIONAL THERAPY EVALUATION/DISCHARGE  Patient: Koki Rossi (80 y.o. female)  Date: 6/26/2021  Primary Diagnosis: Syncope [R55]       Precautions: fall       ASSESSMENT  Based on the objective data described below, the patient presents with confusion and was irritable but with encouragement with family pt was able to participate  She was not orthostatic this session with supine to sit (see flow sheets for details) and pt doesn't stand at baseline. BUE are functional and pt only feeds and brushes teeth with set up which pt is able to do at this time. Min assist for upper trunk holding onto pts hand but pt was able to manage LE off and back onto bed. Min assist to scoot laterally seated EOB. Pt is at her functional baseline. Further OT services are not needed at this time. Current Level of Function (ADLs/self-care):   Min assist bed mobility     Feeding: Setup    Oral Facial Hygiene/Grooming: Setup    Bathing: Maximum assistance; Total assistance    Upper Body Dressing: Maximum assistance; Total assistance    Lower Body Dressing: Total assistance    Toileting: Total assistance    Functional Outcome Measure: The patient scored 10/100 on the barthel outcome measure which is indicative of significant decline in mobility and ADLS. Other factors to consider for discharge: bed bound with family assist     PLAN :  Recommend with staff: frequent turns    Recommendation for discharge: (in order for the patient to meet his/her long term goals)  No skilled occupational therapy/ follow up rehabilitation needs identified at this time. This discharge recommendation:  Has been made in collaboration with the attending provider and/or case management    IF patient discharges home will need the following DME: none       SUBJECTIVE:   Patient stated [facility] do I need to do that? Leave me alone.     OBJECTIVE DATA SUMMARY:   HISTORY:   Past Medical History:   Diagnosis Date    Cataract     Glaucoma     Hypercholesteremia     Hypertension    History reviewed. No pertinent surgical history. Prior Level of Function/Environment/Context:     FUNCTIONAL STATUS PRIOR TO ADMISSION: bed bound, sits on the side of the bed and is able to self feed after set up, min assist for supine to sit (pt is able to manage LE), min assist to scoot EOB, total assist to scoot at bed level with draw sheet, bed level toileting/dressing and bathing performed by pts daughter, able to brush teeth after set up otherwise unable to perform grooming, medical transport transports pt in and out of home and to dr noriega, family is in the process of getting a new wheelchair, pts family has a sliding board and was using this with pt to get OOB prior to needing a new wheelchair, family has otherwise been \"lifting\" pt to a suface    HOME SUPPORT PRIOR TO ADMISSION: The patient lived with daughter whom is pts caregiver. Expanded or extensive additional review of patient history:   Home Situation  Home Environment: Private residence  # Steps to Enter: 4  One/Two Story Residence: Two story, live on 1st floor  Living Alone: No  Current DME Used/Available at Home:  (adjustible bed, s)    Hand dominance: Right    EXAMINATION OF PERFORMANCE DEFICITS:  Cognitive/Behavioral Status:     Orientation Level: Oriented to person;Oriented to place  Cognition: Follows commands  Perception: Appears intact  Perseveration: No perseveration noted  Safety/Judgement: Not assessed    Hearing: Auditory  Auditory Impairment: Hard of hearing, bilateral    Vision/Perceptual:                           Acuity:  (blind)         Range of Motion:    AROM: Generally decreased, functional (except both knees in flexion contracture)                         Strength:    Strength: Generally decreased, functional                Coordination:  Coordination: Generally decreased, functional  Fine Motor Skills-Upper: Left Intact; Right Intact    Gross Motor Skills-Upper: Left Intact; Right Intact    Tone & Sensation:    Tone: Normal  Sensation: Intact                      Balance:  Sitting: Intact    Functional Mobility and Transfers for ADLs:  Bed Mobility:  Rolling: Independent  Supine to Sit: Minimum assistance;Assist x1  Sit to Supine: Minimum assistance;Assist x1  Scooting: Minimum assistance;Assist x1 (from sitting edge of bed)      ADL Assessment:  Feeding: Setup    Oral Facial Hygiene/Grooming: Setup    Bathing: Maximum assistance; Total assistance    Upper Body Dressing: Maximum assistance; Total assistance    Lower Body Dressing: Total assistance    Toileting: Total assistance                Cognitive Retraining  Safety/Judgement: Not assessed      Functional Measure:  Barthel Index:    Bathin  Bladder: 0  Bowels: 0  Groomin  Dressin  Feedin  Mobility: 0  Stairs: 0  Toilet Use: 0  Transfer (Bed to Chair and Back): 5  Total: 10/100        The Barthel ADL Index: Guidelines  1. The index should be used as a record of what a patient does, not as a record of what a patient could do. 2. The main aim is to establish degree of independence from any help, physical or verbal, however minor and for whatever reason. 3. The need for supervision renders the patient not independent. 4. A patient's performance should be established using the best available evidence. Asking the patient, friends/relatives and nurses are the usual sources, but direct observation and common sense are also important. However direct testing is not needed. 5. Usually the patient's performance over the preceding 24-48 hours is important, but occasionally longer periods will be relevant. 6. Middle categories imply that the patient supplies over 50 per cent of the effort. 7. Use of aids to be independent is allowed. Sánchez Zuñiga., Barthel, D.W. (1224). Functional evaluation: the Barthel Index. [facility] (14)2. ADAIR Dela Cruz, Maxwell Alcala.Herberth., Giancarlo, [facility] [facility] ().  Measuring the change indisability after inpatient rehabilitation; comparison of the responsiveness of the Barthel Index and Functional [place] Measure. Journal of Neurology, Neurosurgery, and Psychiatry, 66(4), 440-457. MIRI Jay.JERO, LINA Perez, & Chase Roman M.A. (2004.) Assessment of post-stroke quality of life in cost-effectiveness studies: The usefulness of the Barthel Index and the EuroQoL-5D. Quality of Life Research, 15, 428-57         Occupational Therapy Evaluation Charge Determination   History Examination Decision-Making   MEDIUM Complexity : Expanded review of history including physical, cognitive and psychosocial  history  LOW Complexity : 1-3 performance deficits relating to physical, cognitive , or psychosocial skils that result in activity limitations and / or participation restrictions  LOW Complexity : No comorbidities that affect functional and no verbal or physical assistance needed to complete eval tasks       Based on the above components, the patient evaluation is determined to be of the following complexity level: LOW   Pain Rating:  No report of pain    Activity Tolerance:   Fair    After treatment patient left in no apparent distress:    Supine in bed, Call bell within reach and Caregiver / family present    COMMUNICATION/EDUCATION:   The patients plan of care was discussed with: Physical therapist, Registered nurse and patient and her daughters.      Thank you for this referral.  Giulia Muro OTR/L  Time Calculation: 13 mins

## 2021-06-26 NOTE — PROGRESS NOTES
I reviewed pertinent labs and imaging, and discussed /agreed on the plan of care with Dr. Victoria Holder        Hospitalist Progress Note    NAME: Marta Arce   :  1934   MRN:  [id number]       Assessment / Plan:    Syncope  History of atrial fibrillation? Sinus bradycardia (55)  - family reports this happens occasionally states grandfather had similar episodes   - Daughter states pt had a Holter [placed in 2019 to r/o SSS states pt did  Not need pacemaker per Cardiologist then ,   [facility] pending   - will check orthostatics ( pt non ambulatory _   - cont Eliquis - states recent scope for hematuria by Urology at [facility] -no findings     Hyperlipidemia  - cont statin     Glaucoma   - pt blind       S/P ORIF for left wrist and left distal femur fracture   - afraid to walk since then          Body mass index is 34.45 kg/m². Estimated discharge date:   Barriers:    Code status: Full  Prophylaxis: Apixaban  Recommended Disposition: Home w/Family     Subjective:     Chief Complaint / Reason for Physician Visit  \"Im I going home \". Discussed with RN events overnight. Spoke to pt and disughters  at bedside     Review of Systems:  Symptom Y/N Comments  Symptom Y/N Comments   Fever/Chills n   Chest Pain n    Poor Appetite n   Edema n    Cough n   Abdominal Pain n    Sputum n   Joint Pain n    SOB/SMALLWOOD n   Pruritis/Rash     Nausea/vomit n   Tolerating PT/OT n    Diarrhea    Tolerating Diet y    Constipation    Other       Could NOT obtain due to:      Objective:     VITALS:   Last 24hrs VS reviewed since prior progress note.  Most recent are:  Patient Vitals for the past 24 hrs:   Temp Pulse Resp BP SpO2   21 0814 98.1 °F (36.7 °C) 64 20 126/70 99 %   21 0332 97.7 °F (36.5 °C) 63 20 (!) 148/69 100 %   21 2338 98.1 °F (36.7 °C) 65 18 (!) 143/67 97 %   21 2053 97.7 °F (36.5 °C) 61 18 (!) 147/78 97 %   21 1825 97.5 °F (36.4 °C) 68 18 (!) 143/63 100 %   21 1645 98.4 °F (36.9 °C) 68 15 (!) 141/79 97 %   06/25/21 1538  66 18 137/72    06/25/21 1530  66 21 125/67 97 %   06/25/21 1445  66 18 (!) 115/59 97 %   06/25/21 1400  66 18 114/61 97 %   06/25/21 1351     96 %   06/25/21 1300  66 20 (!) 108/59 97 %   06/25/21 1215  (!) 59 17 119/61 95 %   06/25/21 1153 97.7 °F (36.5 °C) (!) 56 14 120/86 99 %     No intake or output data in the 24 hours ending 06/26/21 1122     I had a face to face encounter and independently examined this patient on 6/26/2021, as outlined below:  PHYSICAL EXAM:  General: WD, WN. Alert, cooperative, no acute distress    EENT:  . Anicteric sclerae. MMM No teeth [place]  Blind   Resp:  CTA bilaterally, no wheezing or rales. No accessory muscle use  CV:  AFib  rhythm,  No edema  GI:  Soft, Non distended, Non tender. +Bowel sounds  Neurologic:  Alert and oriented X 3, normal speech,   Psych:    Not anxious nor agitated  Skin:  No rashes. No jaundice    Reviewed most current lab test results and cultures  YES  Reviewed most current radiology test results   YES  Review and summation of old records today    NO  Reviewed patient's current orders and MAR    YES  PMH/SH reviewed - no change compared to H&P  ________________________________________________________________________  Care Plan discussed with:    Comments   Patient x    Family      RN x    Care Manager     Consultant                        Multidiciplinary team rounds were held today with , nursing, pharmacist and clinical coordinator. Patient's plan of care was discussed; medications were reviewed and discharge planning was addressed. ________________________________________________________________________  Total NON critical care TIME: 30   Minutes    Total CRITICAL CARE TIME Spent:   Minutes non procedure based      Comments   >50% of visit spent in counseling and coordination of care     ________________________________________________________________________  Dayan Kaur NP Procedures: see electronic medical records for all procedures/Xrays and details which were not copied into this note but were reviewed prior to creation of Plan. LABS:  I reviewed today's most current labs and imaging studies. Pertinent labs include:  Recent Labs     06/25/21  1218   WBC 7.9   HGB 11.2*   HCT 33.3*        Recent Labs     06/25/21  1218      K 3.9      CO2 30   *   BUN 9   CREA 0.55   CA 9.1   MG 2.0   ALB 3.1*   TBILI 0.3   ALT 14       Signed: Beverly Spencer, NP

## 2021-06-26 NOTE — PROGRESS NOTES
PHYSICAL THERAPY EVALUATION/DISCHARGE  Patient: Cintia Crowley (80 y.o. female)  Date: 6/26/2021  Primary Diagnosis: Syncope [R55]       Precautions:          ASSESSMENT  Based on the objective data described below, the patient presents with baseline level of strength, balance, and bed mobility. Spoke with patient's caregiver/daughter over speaker-phone while daughter from [place] is present in room. Patient comes to sit with minimal assist, and after balance is established, she is independent with static sitting at edge of bed. Blood pressure in sitting is WFL and does not change significantly from supine to sitting (see Flowsheets). 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 Daughter has been able to manage patient's care in her home. She states that she has ordered a wheelchair for the patient, and that she does have a sliding board for transfers to the wheelchair. Patient is able to scoot to the right at bedside with minimal assist, and she returns to sitting with minimal assist. Patient left supine in bed with daughter present. Since patient is at her baseline level of function and caregiver/daughter is equipped to manage patient's care, patient has no further acute care PT needs. Functional Outcome Measure: The patient scored 2/20 on the Elder Mobility Scale outcome measure which is indicative of dependence with mobility/ADLs. Other factors to consider for discharge: good family support and care-giving, has equipment     Further skilled acute physical therapy is not indicated at this time. PLAN :  Recommendation for discharge: (in order for the patient to meet his/her long term goals)  No skilled physical therapy/ follow up rehabilitation needs identified at this time.     This discharge recommendation:  Has not yet been discussed the attending provider and/or case management    IF patient discharges home will need the following DME: patient owns DME required for discharge       SUBJECTIVE:   Patient stated I want to go home.     OBJECTIVE DATA SUMMARY:   HISTORY:    Past Medical History:   Diagnosis Date    Cataract     Glaucoma     Hypercholesteremia     Hypertension    History reviewed. No pertinent surgical history. Prior level of function: minimal assist at bed-bound level  Personal factors and/or comorbidities impacting plan of care: blind, [place], flexion contracture both knees    Home Situation  Home Environment: Private residence  # Steps to Enter: 4  One/Two Story Residence: Two story, live on 1st floor  Living Alone: No  Current DME Used/Available at Home:  (adjustible bed, s)    EXAMINATION/PRESENTATION/DECISION MAKING:   Critical Behavior:     Orientation Level: Oriented to person, Oriented to place  Cognition: Follows commands  Safety/Judgement: Not assessed  Hearing: Auditory  Auditory Impairment: Hard of hearing, bilateral  Skin:  Grossly intact  Edema: none noted  Range Of Motion:  AROM: Generally decreased, functional (except both knees in flexion contracture)                       Strength:    Strength: Generally decreased, functional                    Tone & Sensation:   Tone: Normal              Sensation: Intact               Coordination:  Coordination: Generally decreased, functional  Vision:   Acuity:  (blind)  Functional Mobility:  Bed Mobility:  Rolling: Independent  Supine to Sit: Minimum assistance;Assist x1  Sit to Supine: Minimum assistance;Assist x1  Scooting: Minimum assistance;Assist x1 (from sitting edge of bed)  Balance:   Sitting: Intact    Therapeutic Exercises:   AROM both UEs in available ranges from sitting    Functional Measure:    Elder Mobility Scale    2/20         Scores under 10  generally these patients are dependent in mobility maneuvers; require help with  basic ADL, such as transfers, toileting and dressing.     Scores between 10  13  generally these patients are borderline in terms of safe mobility and  independence in ADL i.e. they require some help with some mobility maneuvers. Scores over 14  Generally these patients are able to perform mobility maneuvers alone and safely  and are independent in basic ADL. Physical Therapy Evaluation Charge Determination   History Examination Presentation Decision-Making   MEDIUM  Complexity : 1-2 comorbidities / personal factors will impact the outcome/ POC  LOW Complexity : 1-2 Standardized tests and measures addressing body structure, function, activity limitation and / or participation in recreation  LOW Complexity : Stable, uncomplicated  LOW Complexity : FOTO score of       Based on the above components, the patient evaluation is determined to be of the following complexity level: LOW     Pain Rating:  Patient does not report pain    Activity Tolerance:   Fair      After treatment patient left in no apparent distress:   Supine in bed, Call bell within reach, Caregiver / family present and Side rails x 3    COMMUNICATION/EDUCATION:   The patients plan of care was discussed with: Occupational therapist and Registered nurse. Fall prevention education was provided and the patient/caregiver indicated understanding. and Patient/family have participated as able in goal setting and plan of care.     Thank you for this referral.  Robby Leal, PT   Time Calculation: 17 mins

## 2021-06-27 VITALS
TEMPERATURE: 98.1 F | HEIGHT: 65 IN | HEART RATE: 75 BPM | OXYGEN SATURATION: 95 % | BODY MASS INDEX: 34.49 KG/M2 | SYSTOLIC BLOOD PRESSURE: 140 MMHG | RESPIRATION RATE: 20 BRPM | DIASTOLIC BLOOD PRESSURE: 71 MMHG | WEIGHT: 207.01 LBS

## 2021-06-27 PROBLEM — E78.5 HYPERLIPIDEMIA: Status: ACTIVE | Noted: 2021-06-27

## 2021-06-27 PROBLEM — H40.9 GLAUCOMA: Status: ACTIVE | Noted: 2021-06-27

## 2021-06-27 PROCEDURE — 99218 HC RM OBSERVATION: CPT

## 2021-06-27 PROCEDURE — 74011250637 HC RX REV CODE- 250/637: Performed by: INTERNAL MEDICINE

## 2021-06-27 RX ADMIN — APIXABAN 5 MG: 5 TABLET, FILM COATED ORAL at 11:00

## 2021-06-27 NOTE — PROGRESS NOTES
Transition of Care Plan:    RUR: N/A - Observation  Disposition: Home with Daughter/Caregiver  Follow up appointments: PCP  DME needed: N/A - w/c ordered, sliding board at home  Transportation at Discharge: [employer] [facility]   **PICK-UP TODAY BETWEEN 11:30AM-2:30PM  Keys or means to access home: Yes, DTR will be at home  IM Medicare letter: N/A  Caregiver Contact: Jeyanaya Millan (DTR) [phone]  Discharge Caregiver contacted prior to discharge? Yes    81 YO  Female admitted under observation status on 6/25/21 for Syncope. Lives in [address] [place] [address] ([address]) with daughter and granddaughter in [place], [place]. Lives on the 1st floor. Per family report, pt is Min A to bed bound at baseline. DTR provides assistance with bathing. Pt is able to feed self with setup. Needs Min A to sit up in bed. Pt does not drive, if she needs to leave the home needs BLS transport through [facility]. Denies hx of HH or IPR. DTR reported hx of SNF in 2012. Has sliding board at home and ordered a new w/c. DTR maintains regular contact with pt's PCP (Kishore Jolley NP @ [facility]) who provides home visits. Preferred Rx is [facility]. CM spoke with pt's DTR/NOK (Jey Millan, [phone]) to confirm d/c plan for today as orders were placed this AM. DTR will be at home to receive pt, requesting [facility] transport to be arranged. DTR has been in contact with PCP already, requesting for CM to get in contact with their office and send over any records as needed. PT/OT evaluations completed, no recommendations for d/c. DTR denied any other needs outside of transportation arrangement. Verbal consent provided to sign Observation Letter. 9:29AM - CM attempted to call nursing x4 with no success to confirm discharge for today and to setup transport through [address] Will continue to call floor.        9:40AM - CM spoke with nursing, informed that medical team is trying to get in touch with Cardiology for a heart or event monitor for home before she can discharge today. CM requested for nursing to contact  when pt is ready for transport to be arranged. 10:40AM - CM spoke with NP who reported that pt does not need to stay for cardiac/event monitor and referral was made to [facility] and they will follow-up with pt as an outpatient. CM called [facility] ([phone]), placed on hold for 20 minutes and informed that pt's account is inactive and doesn't match the info on file. Informed by their supervisor that pt has [employer] plan and for CM to call them to arrange transportation ([phone]). 11:02AM - CM called [facility] ([phone]) to setup transportation and informed that they have no record of pt with that insurance despite looking up her information several different ways on the transportation line ([address] Advised CM to call back again to main line to speak to someone else outside of transportation services. 11:20AM - CM attempted to call [employer], disconnected several times, sent to voicemail that is only checked Monday-Friday. CM called back [facility] ([phone]), informed they still cannot find her in their system but will attempt to look it up another way. 11:30AM - [facility]  ([phone])  was able to locate pt in the system with Member ID #[id number] after several more searches. [facility] transportation scheduled for pick-up today (Confirmation [id number]) can take up to 3-3.5 hours (2:30-3:00PM). If do not hear response by 1:30-2:00PM, to give a call back to confirm pick-up time. [facility] paperwork placed on bedside chart. CM notified DTR by phone. All information entered into pt AVS.     Pt has no additional CM needs at this time. Care Management Interventions  PCP Verified by CM:  Yes (Katherene Primrose, NP for [facility] through [facility])  Palliative Care Criteria Met (RRAT>21 & CHF Dx)?: No  Mode of Transport at Discharge: S ([facility])  Transition of Care Consult (CM Consult): Discharge Planning  Discharge Durable Medical Equipment: No (w/c ordered, sliding board at home)  Health Maintenance Reviewed: Yes  Physical Therapy Consult: Yes  Occupational Therapy Consult: Yes  Speech Therapy Consult: No  Current Support Network: Lives with Caregiver (2-story house ([address]), lives on 1st floor with DTR and granddaughter)  Confirm Follow Up Transport: Other (see comment) ([facility] transport)  The Plan for Transition of Care is Related to the Following Treatment Goals : Follow-up Care Appointments  The Patient and/or Patient Representative was Provided with a Choice of Provider and Agrees with the Discharge Plan?: Yes  Name of the Patient Representative Who was Provided with a Choice of Provider and Agrees with the Discharge Plan:  Viviane Rey (pt), Go Price (DTR/CG)  Discharge Location  Discharge Placement: Home with family assistance    Aubrey Saint, [address] [place] [address]  [phone]

## 2021-06-27 NOTE — PROGRESS NOTES
Problem: Pressure Injury - Risk of  Goal: *Prevention of pressure injury  Description: Document Shawn Scale and appropriate interventions in the flowsheet. Outcome: Progressing Towards Goal  Note: Pressure Injury Interventions:  Sensory Interventions: Assess changes in LOC, Check visual cues for pain, Maintain/enhance activity level, Pressure redistribution bed/mattress (bed type)    Moisture Interventions: Absorbent underpads, Check for incontinence Q2 hours and as needed, Maintain skin hydration (lotion/cream), Moisture barrier    Activity Interventions: Pressure redistribution bed/mattress(bed type)    Mobility Interventions: Pressure redistribution bed/mattress (bed type)    Nutrition Interventions: Document food/fluid/supplement intake    Friction and Shear Interventions: Apply protective barrier, creams and emollients, HOB 30 degrees or less, Lift sheet, Lift team/patient mobility team                Problem: Falls - Risk of  Goal: *Absence of Falls  Description: Document Maria Elena Fall Risk and appropriate interventions in the flowsheet. Outcome: Progressing Towards Goal  Note: Fall Risk Interventions:       Mentation Interventions: Adequate sleep, hydration, pain control, Door open when patient unattended         Elimination Interventions:  Toileting schedule/hourly rounds

## 2021-06-27 NOTE — PROGRESS NOTES
Bedside and Verbal shift change report given to [address] Jordi Huntley (oncoming nurse) by Jose Pinedo (offgoing nurse). Report included the following information SBAR, Kardex, MAR, Recent Results and Cardiac Rhythm NSR. patient rested well this night sleeping at long intervals, daughter remained at bedside and performed all personal care for patient.

## 2021-06-27 NOTE — PROGRESS NOTES
Shift summary:  No acute changes or events with this patient. Had two formed bowel movements. Echo was performed today. Plan is to discharge tomorrow.

## 2021-06-27 NOTE — DISCHARGE SUMMARY
Hospitalist Discharge Summary     Patient ID:  King Escobar  [id number]  89 y.o.  1934  6/25/2021    PCP on record: Andres Granados NP    Admit date: 6/25/2021  Discharge date and time: 6/27/2021    DISCHARGE DIAGNOSIS:    Active Hospital Problems    Diagnosis Date Noted    Hyperlipidemia 06/27/2021    Glaucoma 06/27/2021    Syncope 06/25/2021       CONSULTATIONS:  IP CONSULT TO HOSPITALIST    Excerpted HPI from H&P of Yu Fuentes MD:  CHIEF COMPLAINT:  Passed out     HISTORY OF PRESENT ILLNESS:     Gabriella Maharaj is a 80 y.o. female with a history of atrial fibrillation hypertension hyperlipidemia and blindness who presents to the ER via EMS after a syncopal episode. Patient reportedly passed out once back in 2017. The episode today occurred in her bedroom. Patient is blind and hard of hearing.  Family states that the patient was eating and sitting at the edge of her bed.   Family then heard a noise from the room and found the patient passed out laying back on her bed. They do not know if there was loss of pulses but family did start some chest compressions and the patient quickly came to.   Family states they took her blood pressure when she was gasping for breath and feeling bad and they noted it was low at around 859 systolic which is not normal for her.  EMS did note that her heart rate would drop sporadically.  Family does endorse a history of having a low heart rate and states that she is taking Eliquis because of it.  In the ED, patient currently appears to be back at her baseline.  Reportedly she has been eating and drinking well. CTA chest no PE.      We were asked to admit for work up and evaluation of the above problems. ______________________________________________________________________  DISCHARGE SUMMARY/HOSPITAL COURSE:  for full details see H&P, daily progress notes, labs, consult notes. Yves Price [id number] y. o.F was admitted to [facility] on 6/25/2021 and treated for the following medical complaints:     Syncope  History of atrial fibrillation? Sinus bradycardia (55)  - family reports this happens occasionally states grandfather had similar episodes   - Daughter states pt had a Holter [placed in 2019 to r/o SSS states pt did  Not need pacemaker per Cardiologist then ,   - ECHO Normal cavity size, wall thickness and systolic function (ejection fraction normal). Left ventricle not well visualized. The estimated EF is 50 - 55%. Visually measured ejection fraction. Unable to assess diastolic function. - will check orthostatics ( pt non ambulatory ) lays in bed except for meals  Unsure if event was a vagal response but has not been orthostatic , not on any antihypertensives _   -HR stable in the 70's no episodes of pauses or severe bradycardia   - cont Eliquis  For  Afib in the past ? No records for review . Will defer to PCP - states recent scope for hematuria by Urology at [facility] -no findings   - EKG showed NSR with 1st degree block      Hyperlipidemia  - cont statin      Glaucoma   - pt blind         S/P ORIF for left wrist and left distal femur fracture 2012  - afraid to walk since then         Patient admitted for a syncope episode. Family states history of bradycardia not currently on beta-blocker or antihypertensives. States evaluation in 2019 for severe bradycardia of heart rate in 40s however patient did not receive a pacemaker at that time. Initial heart rate on admission was at 55 patient has maintained heart rates in the 70s no episodes of severe bradycardia or pauses noted on telemetry  . Echo done during this admission EF was noted to be 50 to 55%. Discussed with daughter about the possible cardiac event monitor referral was made. Family prefers to follow-up with PCP does not have a relationship with a cardiologist currently. Patient has been otherwise stable, cardiac work-up was negative, no cardiology evaluation this admission.   Family made aware they may receive a call from cardiology in regards to the event monitor. Patient's plan of care has been reviewed with them. Patient and/or family have verbally conveyed their understanding and agreement of the patient's signs, symptoms, diagnosis, treatment and prognosis and additionally agree to follow up as recommended or return to [facility] should their condition change prior to follow-up. Discharge instructions have also been provided to the patient with some educational information regarding their diagnosis as well a list of reasons why they would want to return to the office prior to their follow-up appointment should their condition change.      _______________________________________________________________________  Patient seen and examined by me on discharge day. Pertinent Findings:  Gen:    Not in distress  Chest: Clear lungs  CVS:   Regular rhythm. No edema  Abd:  Soft, not distended, not tender  Neuro:  Alert, Oriented X4   _______________________________________________________________________  DISCHARGE MEDICATIONS:   Current Discharge Medication List      CONTINUE these medications which have NOT CHANGED    Details   cholecalciferol (VITAMIN D3) (5000 Units/125 mcg) tab tablet Take 5,000 Units by mouth every thirty (30) days. acetaminophen (Tylenol Extra Strength) 500 mg tablet Take 500 mg by mouth every six (6) hours as needed for Pain.      sodium chloride 1 gram tablet Take 1 g by mouth two (2) times a day. docusate sodium (COLACE) 100 mg capsule Take 100 mg by mouth daily. albuterol (PROVENTIL VENTOLIN) 2.5 mg /3 mL (0.083 %) nebu 2.5 mg by Nebulization route every six (6) hours as needed for Wheezing or Shortness of Breath. apixaban (ELIQUIS) 5 mg tablet Take 5 mg by mouth two (2) times a day. fluticasone propion-salmeteroL (Advair Diskus) 250-50 mcg/dose diskus inhaler Take 1 Puff by inhalation two (2) times a day.       ascorbic acid (VITAMIN C) 500 mg tablet Take 1 Tab by mouth two (2) times daily (with meals). Qty: 60 Tab, Refills: 0      calcium-vitamin D (OYSTER SHELL) 500 mg(1,250mg) -200 unit per tablet Take 1 Tab by mouth three (3) times daily (with meals). Qty: 90 Tab, Refills: 0      ferrous sulfate 325 mg (65 mg iron) tablet Take 1 Tab by mouth three (3) times daily (with meals). Qty: 90 Tab, Refills: 0      simvastatin (ZOCOR) 20 mg tablet Take 20 mg by mouth nightly. Patient Follow Up Instructions: Activity: Activity as tolerated  Diet: Resume previous diet  Wound Care: None needed    Follow-up with PCP in 1 week. Follow-up tests/labs     Follow-up Information     Follow up With Specialties Details Why Contact Yanet Gary NP Nurse Practitioner Schedule an appointment as soon as possible for a visit in 1 week hospital follow-up [address] [place] [address]      Orestes Lopes MD Cardiology In 1 week Cardiology  [address] [phone]          ________________________________________________________________    Risk of deterioration: Low    Condition at Discharge:  Stable  __________________________________________________________________    Disposition  Home with family and home health services    ____________________________________________________________________    Code Status: Full Code  ___________________________________________________________________      Total time in minutes spent coordinating this discharge (includes going over instructions, follow-up, prescriptions, and preparing report for sign off to her PCP) : 35 minutes    Signed:  Beverly Ferraro, NP

## 2021-07-02 ENCOUNTER — HOSPITAL ENCOUNTER (OUTPATIENT)
Dept: NON INVASIVE DIAGNOSTICS | Age: 86
Discharge: HOME | End: 2021-07-02
Attending: NURSE PRACTITIONER
Payer: MEDICARE

## 2021-07-02 PROCEDURE — 93272 ECG/REVIEW INTERPRET ONLY: CPT | Performed by: INTERNAL MEDICINE

## 2021-07-02 PROCEDURE — 93271 ECG/MONITORING AND ANALYSIS: CPT

## 2022-03-19 PROBLEM — H40.9 GLAUCOMA: Status: ACTIVE | Noted: 2021-06-27

## 2022-03-19 PROBLEM — E78.5 HYPERLIPIDEMIA: Status: ACTIVE | Noted: 2021-06-27

## 2022-03-19 PROBLEM — R55 SYNCOPE: Status: ACTIVE | Noted: 2021-06-25

## 2023-05-23 RX ORDER — ACETAMINOPHEN 500 MG
500 TABLET ORAL EVERY 6 HOURS PRN
COMMUNITY

## 2023-05-23 RX ORDER — SIMVASTATIN 20 MG
20 TABLET ORAL NIGHTLY
COMMUNITY

## 2023-05-23 RX ORDER — FERROUS SULFATE 325(65) MG
325 TABLET ORAL
COMMUNITY
Start: 2012-09-11

## 2023-05-23 RX ORDER — PSEUDOEPHEDRINE HCL 30 MG
100 TABLET ORAL DAILY
COMMUNITY

## 2023-05-23 RX ORDER — SODIUM CHLORIDE 1 G/1
1 TABLET ORAL 2 TIMES DAILY
COMMUNITY

## 2023-05-23 RX ORDER — FLUTICASONE PROPIONATE AND SALMETEROL 250; 50 UG/1; UG/1
1 POWDER RESPIRATORY (INHALATION) 2 TIMES DAILY
COMMUNITY

## 2023-05-23 RX ORDER — ALBUTEROL SULFATE 2.5 MG/3ML
2.5 SOLUTION RESPIRATORY (INHALATION) EVERY 6 HOURS PRN
COMMUNITY

## 2023-05-23 RX ORDER — B-COMPLEX WITH VITAMIN C
1 TABLET ORAL
COMMUNITY
Start: 2012-09-11

## 2023-05-23 RX ORDER — ASCORBIC ACID 500 MG
500 TABLET ORAL 2 TIMES DAILY WITH MEALS
COMMUNITY
Start: 2012-09-11